# Patient Record
Sex: MALE | Race: WHITE | NOT HISPANIC OR LATINO | URBAN - METROPOLITAN AREA
[De-identification: names, ages, dates, MRNs, and addresses within clinical notes are randomized per-mention and may not be internally consistent; named-entity substitution may affect disease eponyms.]

---

## 2020-12-22 ENCOUNTER — ANESTHESIA EVENT (OUTPATIENT)
Dept: GASTROENTEROLOGY | Facility: HOSPITAL | Age: 70
End: 2020-12-22

## 2020-12-22 NOTE — ANESTHESIA PREPROCEDURE EVALUATION
Procedure:  COLONOSCOPY    Relevant Problems   ANESTHESIA (within normal limits)      CARDIO (within normal limits)      ENDO  morbid obesity      GI/HEPATIC (within normal limits)  bowel prep      /RENAL (within normal limits)      GYN (within normal limits)      HEMATOLOGY (within normal limits)      MUSCULOSKELETAL (within normal limits)      NEURO/PSYCH  resolving Lyme's       PULMONARY  refuses to use CPAP  Pt counseled   (+) Obstructive sleep apnea syndrome   (-) Smoking   (-) URI (upper respiratory infection)        Physical Exam    Airway    Mallampati score: II  TM Distance: >3 FB  Neck ROM: full     Dental       Cardiovascular  Cardiovascular exam normal    Pulmonary  Pulmonary exam normal     Other Findings  Fixed upper and lower teeth and in good repair      Anesthesia Plan  ASA Score- 3     Anesthesia Type- IV sedation with anesthesia with ASA Monitors.         Additional Monitors:   Airway Plan:     Comment: Pt has KEYONNA.       Plan Factors-Exercise tolerance (METS): >4 METS.    Chart reviewed. EKG reviewed. Existing labs reviewed. Patient summary reviewed.    Patient is not a current smoker. Patient not instructed to abstain from smoking on day of procedure. Patient did not smoke on day of surgery.    Obstructive sleep apnea risk education given perioperatively.    Induction- intravenous.    Postoperative Plan-     Informed Consent- Anesthetic plan and risks discussed with patient.  I personally reviewed this patient with the CRNA. Discussed and agreed on the Anesthesia Plan with the CRNA..

## 2020-12-23 ENCOUNTER — ANESTHESIA (OUTPATIENT)
Dept: GASTROENTEROLOGY | Facility: HOSPITAL | Age: 70
End: 2020-12-23

## 2020-12-23 ENCOUNTER — HOSPITAL ENCOUNTER (OUTPATIENT)
Dept: GASTROENTEROLOGY | Facility: HOSPITAL | Age: 70
Setting detail: OUTPATIENT SURGERY
Discharge: HOME | End: 2020-12-23
Attending: SURGERY | Admitting: SURGERY
Payer: MEDICARE

## 2020-12-23 VITALS
RESPIRATION RATE: 80 BRPM | HEIGHT: 71 IN | TEMPERATURE: 97.2 F | WEIGHT: 315 LBS | BODY MASS INDEX: 44.1 KG/M2 | SYSTOLIC BLOOD PRESSURE: 170 MMHG | OXYGEN SATURATION: 95 % | DIASTOLIC BLOOD PRESSURE: 81 MMHG | HEART RATE: 94 BPM

## 2020-12-23 DIAGNOSIS — Z12.11 ENCOUNTER FOR SCREENING FOR MALIGNANT NEOPLASM OF COLON: ICD-10-CM

## 2020-12-23 PROBLEM — G47.33 OBSTRUCTIVE SLEEP APNEA SYNDROME: Status: ACTIVE | Noted: 2020-12-23

## 2020-12-23 RX ORDER — SODIUM CHLORIDE 9 MG/ML
125 INJECTION, SOLUTION INTRAVENOUS CONTINUOUS
Status: DISCONTINUED | OUTPATIENT
Start: 2020-12-23 | End: 2020-12-27 | Stop reason: HOSPADM

## 2020-12-23 RX ORDER — SODIUM CHLORIDE 9 MG/ML
INJECTION, SOLUTION INTRAVENOUS CONTINUOUS PRN
Status: SHIPPED | OUTPATIENT
Start: 2020-12-23

## 2020-12-23 RX ORDER — DOXYCYCLINE HYCLATE 100 MG/1
CAPSULE ORAL
COMMUNITY
Start: 2020-12-21

## 2020-12-23 RX ORDER — SODIUM CHLORIDE 9 MG/ML
100 INJECTION, SOLUTION INTRAVENOUS CONTINUOUS
Status: CANCELLED | OUTPATIENT
Start: 2020-12-23

## 2020-12-23 RX ADMIN — SODIUM CHLORIDE 125 ML/HR: 0.9 INJECTION, SOLUTION INTRAVENOUS at 06:55

## 2020-12-23 RX ADMIN — SODIUM CHLORIDE: 0.9 INJECTION, SOLUTION INTRAVENOUS at 06:55

## 2023-07-01 ENCOUNTER — APPOINTMENT (EMERGENCY)
Dept: CT IMAGING | Facility: HOSPITAL | Age: 73
End: 2023-07-01
Payer: MEDICARE

## 2023-07-01 ENCOUNTER — APPOINTMENT (EMERGENCY)
Dept: RADIOLOGY | Facility: HOSPITAL | Age: 73
End: 2023-07-01
Payer: MEDICARE

## 2023-07-01 ENCOUNTER — HOSPITAL ENCOUNTER (EMERGENCY)
Facility: HOSPITAL | Age: 73
Discharge: HOME | End: 2023-07-01
Attending: EMERGENCY MEDICINE
Payer: MEDICARE

## 2023-07-01 VITALS
SYSTOLIC BLOOD PRESSURE: 114 MMHG | RESPIRATION RATE: 20 BRPM | OXYGEN SATURATION: 93 % | BODY MASS INDEX: 46.17 KG/M2 | HEART RATE: 96 BPM | DIASTOLIC BLOOD PRESSURE: 73 MMHG | TEMPERATURE: 98.2 F | WEIGHT: 315 LBS

## 2023-07-01 DIAGNOSIS — R07.89 CHEST WALL PAIN: Primary | ICD-10-CM

## 2023-07-01 DIAGNOSIS — J90 CHRONIC BILATERAL PLEURAL EFFUSIONS: ICD-10-CM

## 2023-07-01 DIAGNOSIS — T81.32XA STERNAL WOUND DEHISCENCE, INITIAL ENCOUNTER: ICD-10-CM

## 2023-07-01 LAB
ANION GAP SERPL CALCULATED.3IONS-SCNC: 4 MMOL/L
ATRIAL RATE: 256 BPM
BASOPHILS # BLD AUTO: 0.03 THOUSANDS/ÂΜL (ref 0–0.1)
BASOPHILS NFR BLD AUTO: 0 % (ref 0–1)
BUN SERPL-MCNC: 19 MG/DL (ref 5–25)
CALCIUM SERPL-MCNC: 8.5 MG/DL (ref 8.4–10.2)
CARDIAC TROPONIN I PNL SERPL HS: 10 NG/L
CHLORIDE SERPL-SCNC: 100 MMOL/L (ref 96–108)
CO2 SERPL-SCNC: 32 MMOL/L (ref 21–32)
CREAT SERPL-MCNC: 0.97 MG/DL (ref 0.6–1.3)
EOSINOPHIL # BLD AUTO: 0.34 THOUSAND/ÂΜL (ref 0–0.61)
EOSINOPHIL NFR BLD AUTO: 4 % (ref 0–6)
ERYTHROCYTE [DISTWIDTH] IN BLOOD BY AUTOMATED COUNT: 15.4 % (ref 11.6–15.1)
GFR SERPL CREATININE-BSD FRML MDRD: 77 ML/MIN/1.73SQ M
GLUCOSE SERPL-MCNC: 121 MG/DL (ref 65–140)
HCT VFR BLD AUTO: 34.6 % (ref 36.5–49.3)
HGB BLD-MCNC: 10.8 G/DL (ref 12–17)
IMM GRANULOCYTES # BLD AUTO: 0.03 THOUSAND/UL (ref 0–0.2)
IMM GRANULOCYTES NFR BLD AUTO: 0 % (ref 0–2)
LYMPHOCYTES # BLD AUTO: 1.9 THOUSANDS/ÂΜL (ref 0.6–4.47)
LYMPHOCYTES NFR BLD AUTO: 24 % (ref 14–44)
MCH RBC QN AUTO: 28.5 PG (ref 26.8–34.3)
MCHC RBC AUTO-ENTMCNC: 31.2 G/DL (ref 31.4–37.4)
MCV RBC AUTO: 91 FL (ref 82–98)
MONOCYTES # BLD AUTO: 0.8 THOUSAND/ÂΜL (ref 0.17–1.22)
MONOCYTES NFR BLD AUTO: 10 % (ref 4–12)
NEUTROPHILS # BLD AUTO: 4.88 THOUSANDS/ÂΜL (ref 1.85–7.62)
NEUTS SEG NFR BLD AUTO: 62 % (ref 43–75)
NRBC BLD AUTO-RTO: 0 /100 WBCS
PLATELET # BLD AUTO: 193 THOUSANDS/UL (ref 149–390)
PMV BLD AUTO: 9.7 FL (ref 8.9–12.7)
POTASSIUM SERPL-SCNC: 4 MMOL/L (ref 3.5–5.3)
QRS AXIS: 61 DEGREES
QRSD INTERVAL: 86 MS
QT INTERVAL: 346 MS
QTC INTERVAL: 450 MS
RBC # BLD AUTO: 3.79 MILLION/UL (ref 3.88–5.62)
SODIUM SERPL-SCNC: 136 MMOL/L (ref 135–147)
T WAVE AXIS: -80 DEGREES
VENTRICULAR RATE: 102 BPM
WBC # BLD AUTO: 7.98 THOUSAND/UL (ref 4.31–10.16)

## 2023-07-01 PROCEDURE — 84484 ASSAY OF TROPONIN QUANT: CPT | Performed by: PHYSICIAN ASSISTANT

## 2023-07-01 PROCEDURE — 99285 EMERGENCY DEPT VISIT HI MDM: CPT

## 2023-07-01 PROCEDURE — 93005 ELECTROCARDIOGRAM TRACING: CPT

## 2023-07-01 PROCEDURE — 99284 EMERGENCY DEPT VISIT MOD MDM: CPT | Performed by: PHYSICIAN ASSISTANT

## 2023-07-01 PROCEDURE — 71275 CT ANGIOGRAPHY CHEST: CPT

## 2023-07-01 PROCEDURE — 71045 X-RAY EXAM CHEST 1 VIEW: CPT

## 2023-07-01 PROCEDURE — 85025 COMPLETE CBC W/AUTO DIFF WBC: CPT | Performed by: PHYSICIAN ASSISTANT

## 2023-07-01 PROCEDURE — 80048 BASIC METABOLIC PNL TOTAL CA: CPT | Performed by: PHYSICIAN ASSISTANT

## 2023-07-01 PROCEDURE — 36415 COLL VENOUS BLD VENIPUNCTURE: CPT | Performed by: PHYSICIAN ASSISTANT

## 2023-07-01 PROCEDURE — G1004 CDSM NDSC: HCPCS

## 2023-07-01 RX ORDER — SODIUM CHLORIDE 9 MG/ML
3 INJECTION INTRAVENOUS
Status: DISCONTINUED | OUTPATIENT
Start: 2023-07-01 | End: 2023-07-01 | Stop reason: HOSPADM

## 2023-07-01 RX ADMIN — IOHEXOL 100 ML: 350 INJECTION, SOLUTION INTRAVENOUS at 12:28

## 2023-07-01 NOTE — ED NOTES
Patient transported to [address] [place] [address] [address], RN  07/01/23 4002

## 2023-07-01 NOTE — ED NOTES
D/c instructions given to ale Lea Finely drivers to give to facility.       [facility], RN  07/01/23 9395

## 2023-07-01 NOTE — ED PROVIDER NOTES
History  Chief Complaint   Patient presents with   • Chest Pain     Pt arrives via ems from [facility] for chest pain and low o2 sats, 80-94 per facility. Pt maintaining 93 % on room air on arrival. S/p open heart 5 weeks ago @ [facility]      67 y.o. male with past medical history significant for CAD, recent AVR replacement 1.5 months ago complicated by sternal wound dehiscence (performed at [facility]) presents to ED via EMS from [facility] with chief complaint of SOB /hypoxia. Onset of symptoms reported as this morning. Location of symptoms reported as chest  Quality is reported as shortness of breath  Severity is reported as moderate  Associated symptoms: denies syncope, denies fevers. Patient reports weakness/unable to walk at baseline since his surgery. Denies fevers. Denies vomiting. Positive cough. Positive chest pain which he reports as chronic since his surgery. Modifying factors: oxygen started by EMS for hypoxia (o2 saturations in 80s)- sats improved. Context: currently at rehab facility following AVR surgery complicated but post operative wound infection. Reports unable to walk since his surgery. Had OLIVA drain in place to right anterior chest wall - 50 cc bloody fluid with clot present in drain on arrival.   Drain site appears clean/dry/intact. Midline sternotomy scar intact without redness or dehiscence.        Past Medical History:   Diagnosis Date   Arrhythmia   x 2 months - tx with Xarelto   Cancer ([address])   skin   Lumbar pain   Lyme disease   treated   Pre-diabetes   Sleep apnea        Past Surgical History:   Procedure Laterality Date   INCISION AND DRAINAGE OF WOUND 5/25/2023   Performed by Simi Tolliver MD at [address] Right 9/6/2022   Performed by Kimmy Bowman MD at  OR   TOE SURGERY Left   shortened 2nd toe       Social History: Family History:   Social History     Socioeconomic History   Marital status:    Tobacco Use   Smoking status: Never   Smokeless tobacco: Never   Vaping Use   Vaping Use: Never used   Substance and Sexual Activity   Alcohol use: Not Currently   Drug use: Never   No family history on file        History provided by:  Patient and EMS personnel   used: No        Prior to Admission Medications   Prescriptions Last Dose Informant Patient Reported? Taking?   doxycycline hyclate (VIBRAMYCIN) 100 mg capsule   Yes No   Sig: TAKE ONE (1) CAPSULE TWICE DAILY      Facility-Administered Medications: None       Past Medical History:   Diagnosis Date   • Cancer ([address])     skin   • CPAP (continuous positive airway pressure) dependence     Has not used for 8 months   • Lyme disease    • Sleep apnea        Past Surgical History:   Procedure Laterality Date   • COLONOSCOPY     • TOE SURGERY Left     2nd toe       Family History   Problem Relation Age of Onset   • Heart disease Father      I have reviewed and agree with the history as documented. E-Cigarette/Vaping   • E-Cigarette Use Never User      E-Cigarette/Vaping Substances     Social History     Tobacco Use   • Smoking status: Never   • Smokeless tobacco: Never   Vaping Use   • Vaping Use: Never used   Substance Use Topics   • Alcohol use: Yes   • Drug use: Never       Review of Systems   Constitutional: Negative for chills and fever. Respiratory: Positive for cough and shortness of breath. Negative for chest tightness and stridor. Cardiovascular: Positive for leg swelling. Negative for chest pain. Gastrointestinal: Negative for abdominal pain, blood in stool, constipation and vomiting. Genitourinary: Negative for decreased urine volume, difficulty urinating and flank pain. Musculoskeletal: Negative for gait problem, neck pain and neck stiffness. Skin: Positive for wound. Negative for rash. Neurological: Positive for weakness (bilateral lower extremities - at baseline since surgery). Negative for seizures, syncope, facial asymmetry and numbness.    All other systems reviewed and are negative. Physical Exam  Physical Exam  Vitals and nursing note reviewed. Constitutional:       General: He is not in acute distress. Appearance: Normal appearance. Comments: /73 (BP Location: Left arm)   Pulse 96   Temp 98.2 °F (36.8 °C) (Oral)   Resp 20   Wt (!) 150 kg (331 lb)   SpO2 93%   BMI 46.17 kg/m²      HENT:      Head: Normocephalic and atraumatic. Right Ear: External ear normal.      Left Ear: External ear normal.      Nose: Nose normal.   Eyes:      General: No scleral icterus. Right eye: No discharge. Left eye: No discharge. Cardiovascular:      Rate and Rhythm: Normal rate. Pulses: Normal pulses. Pulmonary:      Effort: Pulmonary effort is normal.      Breath sounds: Normal breath sounds. No wheezing or rhonchi. Comments: Midline sternotomy scar intact without bleeding or dehiscence, left anterior chest wall OLIVA drain present, 50 mL bloody fluid with clots present. Skin at drain site clean dry and intact  Abdominal:      Tenderness: There is no abdominal tenderness. There is no guarding or rebound. Musculoskeletal:         General: No tenderness, deformity or signs of injury. Cervical back: Normal range of motion and neck supple. No rigidity or tenderness. Right lower leg: Edema present. Left lower leg: Edema present. Skin:     General: Skin is dry. Coloration: Skin is not jaundiced. Findings: No erythema or rash. Neurological:      General: No focal deficit present. Mental Status: He is alert and oriented to person, place, and time. Mental status is at baseline. Motor: Weakness present. Psychiatric:         Mood and Affect: Mood normal.         Behavior: Behavior normal.         Thought Content:  Thought content normal.         Vital Signs  ED Triage Vitals   Temperature Pulse Respirations Blood Pressure SpO2   07/01/23 1115 07/01/23 1113 07/01/23 1113 07/01/23 1113 07/01/23 1113   98.2 °F (36.8 °C) 102 22 105/73 93 %      Temp Source Heart Rate Source Patient Position - Orthostatic VS BP Location FiO2 (%)   07/01/23 1115 07/01/23 1113 07/01/23 1113 07/01/23 1113 --   Oral Monitor Sitting Left arm       Pain Score       07/01/23 1113       7           Vitals:    07/01/23 1113 07/01/23 1130 07/01/23 1200   BP: 105/73 114/78 114/73   Pulse: 102 100 96   Patient Position - Orthostatic VS: Sitting Sitting Sitting         Visual Acuity      ED Medications  Medications   sodium chloride (PF) 0.9 % injection 3 mL (has no administration in time range)   iohexol (OMNIPAQUE) 350 MG/ML injection (MULTI-DOSE) 100 mL (100 mL Intravenous Given 7/1/23 1228)       Diagnostic Studies  Results Reviewed     Procedure Component Value Units Date/Time    HS Troponin 0hr (reflex protocol) [[id number]]  (Normal) Collected: 07/01/23 1135    Lab Status: Final result Specimen: Blood from Arm, Left Updated: 07/01/23 1206     hs TnI 0hr 10 ng/L     HS Troponin I 2hr [[id number]]     Lab Status: No result Specimen: Blood     HS Troponin I 4hr [[id number]]     Lab Status: No result Specimen: Blood     Basic metabolic panel [[id number]] Collected: 07/01/23 1135    Lab Status: Final result Specimen: Blood from Arm, Left Updated: 07/01/23 1158     Sodium 136 mmol/L      Potassium 4.0 mmol/L      Chloride 100 mmol/L      CO2 32 mmol/L      ANION GAP 4 mmol/L      BUN 19 mg/dL      Creatinine 0.97 mg/dL      Glucose 121 mg/dL      Calcium 8.5 mg/dL      eGFR 77 ml/min/1.73sq m     Narrative:      [place] guidelines for Chronic Kidney Disease (CKD):   •  Stage 1 with normal or high GFR (GFR > 90 mL/min/1.73 square meters)  •  Stage 2 Mild CKD (GFR = 60-89 mL/min/1.73 square meters)  •  Stage 3A Moderate CKD (GFR = 45-59 mL/min/1.73 square meters)  •  Stage 3B Moderate CKD (GFR = 30-44 mL/min/1.73 square meters)  •  Stage 4 Severe CKD (GFR = 15-29 mL/min/1.73 square meters)  •  Stage 5 End Stage CKD (GFR <15 mL/min/1.73 square meters)  Note: GFR calculation is accurate only with a steady state creatinine    CBC and differential [[id number]]  (Abnormal) Collected: 07/01/23 1135    Lab Status: Final result Specimen: Blood from Arm, Left Updated: 07/01/23 1140     WBC 7.98 Thousand/uL      RBC 3.79 Million/uL      Hemoglobin 10.8 g/dL      Hematocrit 34.6 %      MCV 91 fL      MCH 28.5 pg      MCHC 31.2 g/dL      RDW 15.4 %      MPV 9.7 fL      Platelets 257 Thousands/uL      nRBC 0 /100 WBCs      Neutrophils Relative 62 %      Immat GRANS % 0 %      Lymphocytes Relative 24 %      Monocytes Relative 10 %      Eosinophils Relative 4 %      Basophils Relative 0 %      Neutrophils Absolute 4.88 Thousands/µL      Immature Grans Absolute 0.03 Thousand/uL      Lymphocytes Absolute 1.90 Thousands/µL      Monocytes Absolute 0.80 Thousand/µL      Eosinophils Absolute 0.34 Thousand/µL      Basophils Absolute 0.03 Thousands/µL                  CTA ED chest PE study   Final Result by Magdy Aceves MD (07/01 1311)      No pulmonary embolus. Trace effusions with minimal dependent bilateral lower lobe atelectasis. Separation of sternal margins by a minimum of 2 cm with fluid attenuation infiltration of the soft tissues between the sternal margins extending along the entire length of the sternum. OLIVA drain between the margins of the sternal manubrium with tip in the anterior mediastinum. Workstation performed: [id number]         X-ray chest 1 view portable   Final Result by Magdy Aceves MD (07/01 1150)      No acute cardiopulmonary disease.                Workstation performed: [id number]                    Procedures  ECG 12 Lead Documentation Only    Date/Time: 7/1/2023 11:14 AM    Performed by: Eamon Brasher PA-C  Authorized by: Eamon Brasher PA-C    Indications / Diagnosis:  C/p  ECG reviewed by me, the ED Provider: yes    Patient location:  ED  Previous ECG: Previous ECG:  Unavailable    Comparison to cardiac monitor: Yes    Interpretation:     Interpretation: normal    Rate:     ECG rate:  102    ECG rate assessment: normal    Rhythm:     Rhythm: atrial flutter    Ectopy:     Ectopy: none    QRS:     QRS axis:  Normal    QRS intervals:  Normal  Conduction:     Conduction: normal    ST segments:     ST segments:  Non-specific  T waves:     T waves: normal               ED Course             HEART Risk Score    Flowsheet Row Most Recent Value   Heart Score Risk Calculator    History 0 Filed at: 07/01/2023 1543   ECG 1 Filed at: 07/01/2023 1543   Age 2 Filed at: 07/01/2023 1543   Risk Factors 2 Filed at: 07/01/2023 1543   Troponin 0 Filed at: 07/01/2023 1543   HEART Score 5 Filed at: 07/01/2023 1543                        SBIRT 22yo+    Flowsheet Row Most Recent Value   Initial Alcohol Screen: US AUDIT-C     1. How often do you have a drink containing alcohol? 0 Filed at: 07/01/2023 1209   2. How many drinks containing alcohol do you have on a typical day you are drinking? 0 Filed at: 07/01/2023 1209   3a. Male UNDER 65: How often do you have five or more drinks on one occasion? 0 Filed at: 07/01/2023 1209   Audit-C Score 0 Filed at: 07/01/2023 1209   GUALBERTO: How many times in the past year have you. .. Used an illegal drug or used a prescription medication for non-medical reasons? Never Filed at: 07/01/2023 1209                    Medical Decision Making  MDM    Ddx includes but is not limited to:  1. ACS/USA  2. Pneumothorax  3. Pneumonia  4. Pleural effusion  5. Pericarditis  6. Pericardial effusion  7. Chest wall pain/costochondritis  8. Esophageal spasm  9. Pulmonary embolism  10. Bronchitis/bronchospasm  11.  Aortic dissection  12 complications from recent cardiac surgery    ED Plan cardiac workup including   ekg and troponin to evaluate for ACS, ischemia  cxr to evaluate for pneumothorax, pleural effusion or infiltrate  CBC to evaluate for anemia  Chemistry panel to evaluate for renal failure  CT of the chest to evaluate for pulmonary embolism given recent surgery and immobility  Cardiac monitor for evaluate for arrhythmias. IV saline lock  Workup results used to narrow differential and determine source for symptoms. Disposition to made based upon risk factors, workup results and patient's ED coarse. MDM:  I have reviewed the patient's vital signs, nursing notes, and other relevant ancillary testing/information. I have had a detailed discussion with the patient regarding the historical points, examination findings, and any diagnostic results    Results: The cardiac monitor revealed aflutter as interpreted by me. The cardiac monitor was ordered secondary to history of chest pain and to monitor patient for potential dysrhythmia. My CXR interpretation  no infiltrate or PTX. My ekg interpretation. Aflutter  Rate 102. No STEMI. CTA chest results reviewed:No pulmonary embolus. Trace effusions with minimal dependent bilateral lower lobe atelectasis. Separation of sternal margins by a minimum of 2 cm with fluid attenuation infiltration of the soft tissues between the sternal margins extending along the entire length of the sternum. OLIVA drain between the margins of the sternal manubrium with tip in the anterior mediastinum. Initial troponin 10, normal.  BNP reviewed: BUN 19, creatinine 0.9 are normal.  No renal failure. CBC reviewed: Blood cell count 7.9 is normal.  Hemoglobin of 10.8 is mildly low, hematocrit 34.6 is low indication for transfusion. ED Course:   Patient observed in emergency department. o2 saturation on room air 93% when awake. Noted to got down to 88-90% when sleeping/lying flat - secondary to apnea. Reviewed ED results with patient and family at bedside. Lower extremity weakness is improving over past 5 weeks. Likely secondary to deconditioning in postop setting.   Mediastinal collection stable - drain in place, is likely cause of chronic chest pain. Patient has appointment with ct surgery in approximately 2 week for OLIVA drain removal.   ACS workup negative. No PE on CT, no pneumonia or PTX. Abnormal findings on CT appear stable compared to [facility] results. Patient is stable for discharge back to rehab. No indication for admission or transfer. Lower oxygen readings likely due to apnea - patient reports he is using CPAP a night but not when lying flat during day, which is likely source of lower sats. Patient currently on antibiotics for sternal fluid collection, OLIVA drain is patent, stable for discharge and outpatient follow up with CT surgery. Chest wall pain likely combination of sternal dehiscence and fluid. No pockets or redness noted to sternal area/wound on exam.   I discussed diagnosis and treatment plan with patient and his family members at bedside. Extended discussion with patient regarding the diagnosis, pathophysiology, expectant coarse and treatment plan. Instructed to follow up with pcp and recommended specialist in 1-2 weeks. Reviewed reasons to return to ed. Patient verbalized understanding of diagnosis and agreement with discharge plan of care as well as understanding of reasons to return to ed. Amount and/or Complexity of Data Reviewed  Labs: ordered. Radiology: ordered. Risk  Prescription drug management.           Disposition  Final diagnoses:   Chest wall pain   Sternal wound dehiscence, initial encounter   Chronic bilateral pleural effusions - trace     Time reflects when diagnosis was documented in both MDM as applicable and the Disposition within this note     Time User Action Codes Description Comment    7/1/2023  1:27 PM Anna Gordon Add [R07.89] Chest wall pain     7/1/2023  1:27 PM Anna Gordon Add [T81.32XA] Sternal wound dehiscence, initial encounter     7/1/2023  1:27 PM Anna Grodon Add [J90] Chronic bilateral pleural effusions     7/1/2023  1:27 PM Anna Gordon Modify [J90] Chronic bilateral pleural effusions - trace       ED Disposition     ED Disposition   Discharge    Condition   Stable    Date/Time   Sat Jul 1, 2023  1:26 PM    Comment   [place] discharge to home/self care. Follow-up Information     Follow up With Specialties Details Why Contact Info Additional Information    Deepak Torrez DO General Practice Call in 3 days for further evaluation of symptoms [address] [facility],  [phone] Emergency Department Emergency Medicine Go to  If symptoms worsen [address] [place] [address] Emergency Department, [place], [place], [facility], MD Thoracic Surgery Call in 2 days for further evaluation of symptoms [address] [id number] [phone]             Patient's Medications   Discharge Prescriptions    No medications on file       No discharge procedures on file.     PDMP Review     None          ED Provider  Electronically Signed by           Lazaro Ayala PA-C  07/01/23 3505

## 2023-07-06 LAB
ATRIAL RATE: 256 BPM
QRS AXIS: 61 DEGREES
QRSD INTERVAL: 86 MS
QT INTERVAL: 346 MS
QTC INTERVAL: 450 MS
T WAVE AXIS: -80 DEGREES
VENTRICULAR RATE: 102 BPM

## 2023-07-06 PROCEDURE — 93010 ELECTROCARDIOGRAM REPORT: CPT | Performed by: INTERNAL MEDICINE

## 2024-10-10 ENCOUNTER — TELEPHONE (OUTPATIENT)
Age: 74
End: 2024-10-10
